# Patient Record
Sex: FEMALE | Race: WHITE | NOT HISPANIC OR LATINO | Employment: PART TIME | URBAN - METROPOLITAN AREA
[De-identification: names, ages, dates, MRNs, and addresses within clinical notes are randomized per-mention and may not be internally consistent; named-entity substitution may affect disease eponyms.]

---

## 2017-04-20 ENCOUNTER — HOSPITAL ENCOUNTER (OUTPATIENT)
Dept: RADIOLOGY | Facility: MEDICAL CENTER | Age: 57
End: 2017-04-20
Attending: PHYSICIAN ASSISTANT
Payer: MEDICAID

## 2017-04-20 DIAGNOSIS — M25.562 LEFT KNEE PAIN, UNSPECIFIED CHRONICITY: ICD-10-CM

## 2017-04-20 PROCEDURE — 73721 MRI JNT OF LWR EXTRE W/O DYE: CPT | Mod: LT

## 2017-06-11 ENCOUNTER — HOSPITAL ENCOUNTER (EMERGENCY)
Facility: MEDICAL CENTER | Age: 57
End: 2017-06-11
Attending: EMERGENCY MEDICINE
Payer: MEDICAID

## 2017-06-11 ENCOUNTER — APPOINTMENT (OUTPATIENT)
Dept: RADIOLOGY | Facility: MEDICAL CENTER | Age: 57
End: 2017-06-11
Attending: EMERGENCY MEDICINE
Payer: MEDICAID

## 2017-06-11 VITALS
RESPIRATION RATE: 16 BRPM | TEMPERATURE: 99.1 F | BODY MASS INDEX: 22.5 KG/M2 | WEIGHT: 140 LBS | DIASTOLIC BLOOD PRESSURE: 91 MMHG | HEIGHT: 66 IN | OXYGEN SATURATION: 97 % | HEART RATE: 68 BPM | SYSTOLIC BLOOD PRESSURE: 138 MMHG

## 2017-06-11 DIAGNOSIS — J40 BRONCHITIS: ICD-10-CM

## 2017-06-11 LAB
ALBUMIN SERPL BCP-MCNC: 3.8 G/DL (ref 3.2–4.9)
ALBUMIN/GLOB SERPL: 1.6 G/DL
ALP SERPL-CCNC: 45 U/L (ref 30–99)
ALT SERPL-CCNC: 16 U/L (ref 2–50)
ANION GAP SERPL CALC-SCNC: 9 MMOL/L (ref 0–11.9)
AST SERPL-CCNC: 18 U/L (ref 12–45)
BASOPHILS # BLD AUTO: 0.3 % (ref 0–1.8)
BASOPHILS # BLD: 0.02 K/UL (ref 0–0.12)
BILIRUB SERPL-MCNC: 0.5 MG/DL (ref 0.1–1.5)
BNP SERPL-MCNC: 69 PG/ML (ref 0–100)
BUN SERPL-MCNC: 11 MG/DL (ref 8–22)
CALCIUM SERPL-MCNC: 9.2 MG/DL (ref 8.5–10.5)
CHLORIDE SERPL-SCNC: 100 MMOL/L (ref 96–112)
CO2 SERPL-SCNC: 23 MMOL/L (ref 20–33)
CREAT SERPL-MCNC: 0.71 MG/DL (ref 0.5–1.4)
EOSINOPHIL # BLD AUTO: 0.03 K/UL (ref 0–0.51)
EOSINOPHIL NFR BLD: 0.5 % (ref 0–6.9)
ERYTHROCYTE [DISTWIDTH] IN BLOOD BY AUTOMATED COUNT: 44 FL (ref 35.9–50)
FLUAV+FLUBV AG SPEC QL IA: NORMAL
GFR SERPL CREATININE-BSD FRML MDRD: >60 ML/MIN/1.73 M 2
GLOBULIN SER CALC-MCNC: 2.4 G/DL (ref 1.9–3.5)
GLUCOSE SERPL-MCNC: 99 MG/DL (ref 65–99)
HCT VFR BLD AUTO: 38.5 % (ref 37–47)
HGB BLD-MCNC: 13.4 G/DL (ref 12–16)
IMM GRANULOCYTES # BLD AUTO: 0.01 K/UL (ref 0–0.11)
IMM GRANULOCYTES NFR BLD AUTO: 0.2 % (ref 0–0.9)
LYMPHOCYTES # BLD AUTO: 1.56 K/UL (ref 1–4.8)
LYMPHOCYTES NFR BLD: 27 % (ref 22–41)
MCH RBC QN AUTO: 32.6 PG (ref 27–33)
MCHC RBC AUTO-ENTMCNC: 34.8 G/DL (ref 33.6–35)
MCV RBC AUTO: 93.7 FL (ref 81.4–97.8)
MONOCYTES # BLD AUTO: 0.47 K/UL (ref 0–0.85)
MONOCYTES NFR BLD AUTO: 8.1 % (ref 0–13.4)
NEUTROPHILS # BLD AUTO: 3.68 K/UL (ref 2–7.15)
NEUTROPHILS NFR BLD: 63.9 % (ref 44–72)
NRBC # BLD AUTO: 0 K/UL
NRBC BLD AUTO-RTO: 0 /100 WBC
PLATELET # BLD AUTO: 122 K/UL (ref 164–446)
PMV BLD AUTO: 9.5 FL (ref 9–12.9)
POTASSIUM SERPL-SCNC: 3.7 MMOL/L (ref 3.6–5.5)
PROT SERPL-MCNC: 6.2 G/DL (ref 6–8.2)
RBC # BLD AUTO: 4.11 M/UL (ref 4.2–5.4)
SIGNIFICANT IND 70042: NORMAL
SITE SITE: NORMAL
SODIUM SERPL-SCNC: 132 MMOL/L (ref 135–145)
SOURCE SOURCE: NORMAL
TROPONIN I SERPL-MCNC: <0.01 NG/ML (ref 0–0.04)
WBC # BLD AUTO: 5.8 K/UL (ref 4.8–10.8)

## 2017-06-11 PROCEDURE — 96360 HYDRATION IV INFUSION INIT: CPT

## 2017-06-11 PROCEDURE — 94640 AIRWAY INHALATION TREATMENT: CPT

## 2017-06-11 PROCEDURE — 83880 ASSAY OF NATRIURETIC PEPTIDE: CPT

## 2017-06-11 PROCEDURE — 96361 HYDRATE IV INFUSION ADD-ON: CPT

## 2017-06-11 PROCEDURE — 84484 ASSAY OF TROPONIN QUANT: CPT

## 2017-06-11 PROCEDURE — 71010 DX-CHEST-PORTABLE (1 VIEW): CPT

## 2017-06-11 PROCEDURE — 700105 HCHG RX REV CODE 258: Performed by: EMERGENCY MEDICINE

## 2017-06-11 PROCEDURE — 700101 HCHG RX REV CODE 250: Performed by: EMERGENCY MEDICINE

## 2017-06-11 PROCEDURE — 87040 BLOOD CULTURE FOR BACTERIA: CPT

## 2017-06-11 PROCEDURE — 99284 EMERGENCY DEPT VISIT MOD MDM: CPT

## 2017-06-11 PROCEDURE — 87400 INFLUENZA A/B EACH AG IA: CPT

## 2017-06-11 PROCEDURE — 85025 COMPLETE CBC W/AUTO DIFF WBC: CPT

## 2017-06-11 PROCEDURE — 80053 COMPREHEN METABOLIC PANEL: CPT

## 2017-06-11 RX ORDER — SODIUM CHLORIDE 9 MG/ML
1000 INJECTION, SOLUTION INTRAVENOUS ONCE
Status: COMPLETED | OUTPATIENT
Start: 2017-06-11 | End: 2017-06-11

## 2017-06-11 RX ORDER — IPRATROPIUM BROMIDE AND ALBUTEROL SULFATE 2.5; .5 MG/3ML; MG/3ML
3 SOLUTION RESPIRATORY (INHALATION)
Status: COMPLETED | OUTPATIENT
Start: 2017-06-11 | End: 2017-06-11

## 2017-06-11 RX ORDER — ALBUTEROL SULFATE 90 UG/1
2 AEROSOL, METERED RESPIRATORY (INHALATION) EVERY 6 HOURS PRN
Qty: 1 INHALER | Refills: 0 | Status: SHIPPED | OUTPATIENT
Start: 2017-06-11

## 2017-06-11 RX ORDER — CODEINE PHOSPHATE/GUAIFENESIN 10-100MG/5
5 LIQUID (ML) ORAL 3 TIMES DAILY PRN
Qty: 120 ML | Refills: 0 | Status: SHIPPED | OUTPATIENT
Start: 2017-06-11

## 2017-06-11 RX ADMIN — IPRATROPIUM BROMIDE AND ALBUTEROL SULFATE 3 ML: .5; 3 SOLUTION RESPIRATORY (INHALATION) at 11:21

## 2017-06-11 RX ADMIN — SODIUM CHLORIDE 1000 ML: 9 INJECTION, SOLUTION INTRAVENOUS at 11:29

## 2017-06-11 ASSESSMENT — PAIN SCALES - GENERAL: PAINLEVEL_OUTOF10: 6

## 2017-06-11 NOTE — ED NOTES
56 y/o female ambulatory to room 11 with EMS with c/o cough, sob and burning pain in her chest when coughing. Pt states her symptoms began last week and have been getting progressively worse.

## 2017-06-11 NOTE — ED AVS SNAPSHOT
Home Care Instructions                                                                                                                Shena Mercado   MRN: [id number]    Department:  [facility], Emergency Dept   Date of Visit:  6/11/2017            [facility], Emergency Dept    [address] [place] [id number]    Phone:  [phone]      You were seen by     Ricardo Camara D.O.      Your Diagnosis Was     Bronchitis     J40       These are the medications you received during your hospitalization from 06/11/2017 1039 to 06/11/2017 1337     Date/Time Order Dose Route Action    06/11/2017 1129 NS infusion 1,000 mL 1,000 mL Intravenous New Bag    06/11/2017 1121 ipratropium-albuterol (DUONEB) nebulizer solution 3 mL 3 mL Nebulization Given      Follow-up Information     1. Follow up with [facility], Emergency Dept.    Specialty:  Emergency Medicine    Why:  If symptoms worsen    Contact information    [address] [place] [id number] [phone]        2. Schedule an appointment as soon as possible for a visit with Gail Porter M.D..    Specialty:  Family Medicine    Why:  As needed    Contact information    [address] [facility] [address]  [phone]        Medication Information     Review all of your home medications and newly ordered medications with your primary doctor and/or pharmacist as soon as possible. Follow medication instructions as directed by your doctor and/or pharmacist.     Please keep your complete medication list with you and share with your physician. Update the information when medications are discontinued, doses are changed, or new medications (including over-the-counter products) are added; and carry medication information at all times in the event of emergency situations.               Medication List      START taking these medications        Instructions    Morning Afternoon Evening Bedtime    albuterol  108 (90 BASE) MCG/ACT Aers inhalation aerosol        Inhale 2 Puffs by mouth every 6 hours as needed for Shortness of Breath.   Dose:  2 Puff                        guaifenesin-codeine 100-10 MG/5ML syrup   Commonly known as:  TUSSI-ORGANIDIN NR        Take 5 mL by mouth 3 times a day as needed for Cough.   Dose:  5 mL                             Where to Get Your Medications      You can get these medications from any pharmacy     Bring a paper prescription for each of these medications    - albuterol 108 (90 BASE) MCG/ACT Aers inhalation aerosol  - guaifenesin-codeine 100-10 MG/5ML syrup            Procedures and tests performed during your visit     Procedure/Test Number of Times Performed    BLOOD CULTURE 2    BTYPE NATRIURETIC PEPTIDE 1    CBC WITH DIFFERENTIAL 1    COMP METABOLIC PANEL 1    DX-CHEST-PORTABLE (1 VIEW) 1    ESTIMATED GFR 1    INFLUENZA RAPID 1    NURSING COMMUNICATION 1    TROPONIN 1        Discharge Instructions       Bronchitis  Bronchitis is the body's way of reacting to injury and/or infection (inflammation) of the bronchi. Bronchi are the air tubes that extend from the windpipe into the lungs. If the inflammation becomes severe, it may cause shortness of breath.  CAUSES   Inflammation may be caused by:  1. A virus.  2. Germs (bacteria).  3. Dust.  4. Allergens.  5. Pollutants and many other irritants.  The cells lining the bronchial tree are covered with tiny hairs (cilia). These constantly beat upward, away from the lungs, toward the mouth. This keeps the lungs free of pollutants. When these cells become too irritated and are unable to do their job, mucus begins to develop. This causes the characteristic cough of bronchitis. The cough clears the lungs when the cilia are unable to do their job. Without either of these protective mechanisms, the mucus would settle in the lungs. Then you would develop pneumonia.  Smoking is a common cause of bronchitis and can contribute to pneumonia.  Stopping this habit is the single most important thing you can do to help yourself.  TREATMENT   1. Your caregiver may prescribe an antibiotic if the cough is caused by bacteria. Also, medicines that open up your airways make it easier to breathe. Your caregiver may also recommend or prescribe an expectorant. It will loosen the mucus to be coughed up. Only take over-the-counter or prescription medicines for pain, discomfort, or fever as directed by your caregiver.  2. Removing whatever causes the problem (smoking, for example) is critical to preventing the problem from getting worse.  3. Cough suppressants may be prescribed for relief of cough symptoms.  4. Inhaled medicines may be prescribed to help with symptoms now and to help prevent problems from returning.  5. For those with recurrent (chronic) bronchitis, there may be a need for steroid medicines.  SEEK IMMEDIATE MEDICAL CARE IF:   · During treatment, you develop more pus-like mucus (purulent sputum).  · You have a fever.  · Your baby is older than 3 months with a rectal temperature of 102° F (38.9° C) or higher.  · Your baby is 3 months old or younger with a rectal temperature of 100.4° F (38° C) or higher.  · You become progressively more ill.  · You have increased difficulty breathing, wheezing, or shortness of breath.  It is necessary to seek immediate medical care if you are elderly or sick from any other disease.  MAKE SURE YOU:   · Understand these instructions.  · Will watch your condition.  · Will get help right away if you are not doing well or get worse.  Document Released: 12/18/2006 Document Revised: 03/11/2013 Document Reviewed: 10/27/2009  ExitCare® Patient Information ©2014 [email or web address].[email or web address].    How to Use an Inhaler  Proper inhaler technique is very important. Good technique ensures that the medicine reaches the lungs. Poor technique results in depositing the medicine on the tongue and back of the throat rather than in the airways. If you do not  use the inhaler with good technique, the medicine will not help you.  STEPS TO FOLLOW IF USING AN INHALER WITHOUT AN EXTENSION TUBE  6. Remove the cap from the inhaler.  7. If you are using the inhaler for the first time, you will need to prime it. Shake the inhaler for 5 seconds and release four puffs into the air, away from your face. Ask your health care provider or pharmacist if you have questions about priming your inhaler.  8. Shake the inhaler for 5 seconds before each breath in (inhalation).  9. Position the inhaler so that the top of the canister faces up.  10. Put your index finger on the top of the medicine canister. Your thumb supports the bottom of the inhaler.  11. Open your mouth.  12. Either place the inhaler between your teeth and place your lips tightly around the mouthpiece, or hold the inhaler 1-2 inches away from your open mouth. If you are unsure of which technique to use, ask your health care provider.  13. Breathe out (exhale) normally and as completely as possible.  14. Press the canister down with your index finger to release the medicine.  15. At the same time as the canister is pressed, inhale deeply and slowly until your lungs are completely filled. This should take 4-6 seconds. Keep your tongue down.  16. Hold the medicine in your lungs for 5-10 seconds (10 seconds is best). This helps the medicine get into the small airways of your lungs.  17. Breathe out slowly, through pursed lips. Whistling is an example of pursed lips.  18. Wait at least 15-30 seconds between puffs. Continue with the above steps until you have taken the number of puffs your health care provider has ordered. Do not use the inhaler more than your health care provider tells you.  19. Replace the cap on the inhaler.  20. Follow the directions from your health care provider or the inhaler insert for cleaning the inhaler.  STEPS TO FOLLOW IF USING AN INHALER WITH AN EXTENSION (SPACER)  6. Remove the cap from the  inhaler.  7. If you are using the inhaler for the first time, you will need to prime it. Shake the inhaler for 5 seconds and release four puffs into the air, away from your face. Ask your health care provider or pharmacist if you have questions about priming your inhaler.  8. Shake the inhaler for 5 seconds before each breath in (inhalation).  9. Place the open end of the spacer onto the mouthpiece of the inhaler.  10. Position the inhaler so that the top of the canister faces up and the spacer mouthpiece faces you.  11. Put your index finger on the top of the medicine canister. Your thumb supports the bottom of the inhaler and the spacer.  12. Breathe out (exhale) normally and as completely as possible.  13. Immediately after exhaling, place the spacer between your teeth and into your mouth. Close your lips tightly around the spacer.  14. Press the canister down with your index finger to release the medicine.  15. At the same time as the canister is pressed, inhale deeply and slowly until your lungs are completely filled. This should take 4-6 seconds. Keep your tongue down and out of the way.  16. Hold the medicine in your lungs for 5-10 seconds (10 seconds is best). This helps the medicine get into the small airways of your lungs. Exhale.  17. Repeat inhaling deeply through the spacer mouthpiece. Again hold that breath for up to 10 seconds (10 seconds is best). Exhale slowly. If it is difficult to take this second deep breath through the spacer, breathe normally several times through the spacer. Remove the spacer from your mouth.  18. Wait at least 15-30 seconds between puffs. Continue with the above steps until you have taken the number of puffs your health care provider has ordered. Do not use the inhaler more than your health care provider tells you.  19. Remove the spacer from the inhaler, and place the cap on the inhaler.  20. Follow the directions from your health care provider or the inhaler insert for  cleaning the inhaler and spacer.  If you are using different kinds of inhalers, use your quick relief medicine to open the airways 10-15 minutes before using a steroid if instructed to do so by your health care provider. If you are unsure which inhalers to use and the order of using them, ask your health care provider, nurse, or respiratory therapist.  If you are using a steroid inhaler, always rinse your mouth with water after your last puff, then gargle and spit out the water. Do not swallow the water.  AVOID:  · Inhaling before or after starting the spray of medicine. It takes practice to coordinate your breathing with triggering the spray.  · Inhaling through the nose (rather than the mouth) when triggering the spray.  HOW TO DETERMINE IF YOUR INHALER IS FULL OR NEARLY EMPTY  You cannot know when an inhaler is empty by shaking it. A few inhalers are now being made with dose counters. Ask your health care provider for a prescription that has a dose counter if you feel you need that extra help. If your inhaler does not have a counter, ask your health care provider to help you determine the date you need to refill your inhaler. Write the refill date on a calendar or your inhaler canister. Refill your inhaler 7-10 days before it runs out. Be sure to keep an adequate supply of medicine. This includes making sure it is not , and that you have a spare inhaler.   SEEK MEDICAL CARE IF:   · Your symptoms are only partially relieved with your inhaler.  · You are having trouble using your inhaler.  · You have some increase in phlegm.  SEEK IMMEDIATE MEDICAL CARE IF:   · You feel little or no relief with your inhalers. You are still wheezing and are feeling shortness of breath or tightness in your chest or both.  · You have dizziness, headaches, or a fast heart rate.  · You have chills, fever, or night sweats.  · You have a noticeable increase in phlegm production, or there is blood in the phlegm.  MAKE SURE YOU:    · Understand these instructions.  · Will watch your condition.  · Will get help right away if you are not doing well or get worse.     This information is not intended to replace advice given to you by your health care provider. Make sure you discuss any questions you have with your health care provider.     Document Released: 12/15/2001 Document Revised: 10/08/2014 Document Reviewed: 07/17/2014  [employer] Interactive Patient Education ©2016 [employer]            Patient Information     Patient Information    Following emergency treatment: all patient requiring follow-up care must return either to a private physician or a clinic if your condition worsens before you are able to obtain further medical attention, please return to the emergency room.     Billing Information    At [facility], we work to make the billing process streamlined for our patients.  Our Representatives are here to answer any questions you may have regarding your hospital bill.  If you have insurance coverage and have supplied your insurance information to us, we will submit a claim to your insurer on your behalf.  Should you have any questions regarding your bill, we can be reached online or by phone as follows:  Online: You are able pay your bills online or live chat with our representatives about any billing questions you may have. We are here to help Monday - Friday from 8:00am to 7:30pm and 9:00am - 12:00pm on Saturdays.  Please visit [email or web address]  for more information.   Phone:  [phone] or [phone]    Please note that your emergency physician, surgeon, pathologist, radiologist, anesthesiologist, and other specialists are not employed by [facility] and will therefore bill separately for their services.  Please contact them directly for any questions concerning their bills at the numbers below:     Emer[facility] Physician Services:  [phone]  [facility]:   [phone]  [facility]:  [phone]  [facility]:  [phone]    1. Your final bill may vary from the amount quoted upon discharge if all procedures are not complete at that time, or if your doctor has additional procedures of which we are not aware. You will receive an additional bill if you return to the Emergency Department at [facility] for suture removal regardless of the facility of which the sutures were placed.     2. Please arrange for settlement of this account at the emergency registration.    3. All self-pay accounts are due in full at the time of treatment.  If you are unable to meet this obligation then payment is expected within 4-5 days.     4. If you have had radiology studies (CT, X-ray, Ultrasound, MRI), you have received a preliminary result during your emergency department visit. Please contact the radiology department [phone] to receive a copy of your final result. Please discuss the Final result with your primary physician or with the follow up physician provided.     Crisis Hotline:  [facility] Crisis Hotline:  4-307-FPURERF or 1-645.548.3462  [place] Crisis Hotline:    1-582.666.2873 or 943-698-2286         ED Discharge Follow Up Questions    1. In order to provide you with very good care, we would like to follow up with a phone call in the next few days.  May we have your permission to contact you?     YES /  NO    2. What is the best phone number to call you? (       )_____-__________    3. What is the best time to call you?      Morning  /  Afternoon  /  Evening                   Patient Signature:  ____________________________________________________________    Date:  ____________________________________________________________

## 2017-06-11 NOTE — ED PROVIDER NOTES
"ED Provider Note    Scribed for Ricardo Camara D.O. by Edith Hancock. 6/11/2017  10:58 AM    Primary care provider: Gail Porter M.D.  Means of arrival: EMS   History obtained from: Patient   History limited by: None    CHIEF COMPLAINT  Chief Complaint   Patient presents with   • Cough   • Shortness of Breath       HPI  Shena Mercado is a 57 y.o. female who presents to the Emergency Department for a cough, worsening two days ago. The patient's illness began with a cough with shortness of breath associated one week ago. The patient's cough and shortness of breath began to worsen two days ago. She also has chest heaviness when she coughs also associated. The patient denies any vomiting, abdominal pain or fever associated.The patient is an occasional smoker. She did not get a flu shot this year.     REVIEW OF SYSTEMS  Pertinent positives include cough, shortness of breath, chest heaviness. Pertinent negatives include no vomiting, abdominal pain, fever.  All other systems reviewed and negative.  C.    PAST MEDICAL HISTORY  No past medical history noted.     SURGICAL HISTORY  No surgical history noted.      SOCIAL HISTORY   Occasional smoker.     FAMILY HISTORY  No family history noted    CURRENT MEDICATIONS  No current medications noted.       ALLERGIES  No Known Allergies    PHYSICAL EXAM  VITAL SIGNS: /91 mmHg  Pulse 70  Temp(Src) 37.3 °C (99.1 °F)  Resp 18  Ht 1.676 m (5' 5.98\")  Wt 63.504 kg (140 lb)  BMI 22.61 kg/m2    Nursing notes and vitals reviewed.  Constitutional: Well developed, Well nourished, No acute distress, Non-toxic appearance.   Eyes: PERRLA, EOMI, Conjunctiva normal, No discharge.   Cardiovascular: Normal heart rate, Normal rhythm, No murmurs, No rubs, No gallops.   Thorax & Lungs: Slight rales, rhonchi, no use of accessory muscles with inspiration and expiration   Abdomen: Bowel sounds normal, Soft, No tenderness, No guarding, No rebound, No masses, No pulsatile " masses.   Skin: Warm, Dry, No erythema, No rash.   Musculoskeletal: Intact distal pulses, No edema, No cyanosis, No clubbing. Good range of motion in all major joints. No tenderness to palpation or major deformities note, no midline back tenderness. No pedal edema  Neurologic: Alert & oriented x 3, Normal motor function, Normal sensory function, No focal deficits noted.  Psychiatric: Affect normal for clinical presentation.    DIAGNOSTIC STUDIES/PROCEDURES    LABS  Results for orders placed or performed during the hospital encounter of 06/11/17   CBC WITH DIFFERENTIAL   Result Value Ref Range    WBC 5.8 4.8 - 10.8 K/uL    RBC 4.11 (L) 4.20 - 5.40 M/uL    Hemoglobin 13.4 12.0 - 16.0 g/dL    Hematocrit 38.5 37.0 - 47.0 %    MCV 93.7 81.4 - 97.8 fL    MCH 32.6 27.0 - 33.0 pg    MCHC 34.8 33.6 - 35.0 g/dL    RDW 44.0 35.9 - 50.0 fL    Platelet Count 122 (L) 164 - 446 K/uL    MPV 9.5 9.0 - 12.9 fL    Neutrophils-Polys 63.90 44.00 - 72.00 %    Lymphocytes 27.00 22.00 - 41.00 %    Monocytes 8.10 0.00 - 13.40 %    Eosinophils 0.50 0.00 - 6.90 %    Basophils 0.30 0.00 - 1.80 %    Immature Granulocytes 0.20 0.00 - 0.90 %    Nucleated RBC 0.00 /100 WBC    Neutrophils (Absolute) 3.68 2.00 - 7.15 K/uL    Lymphs (Absolute) 1.56 1.00 - 4.80 K/uL    Monos (Absolute) 0.47 0.00 - 0.85 K/uL    Eos (Absolute) 0.03 0.00 - 0.51 K/uL    Baso (Absolute) 0.02 0.00 - 0.12 K/uL    Immature Granulocytes (abs) 0.01 0.00 - 0.11 K/uL    NRBC (Absolute) 0.00 K/uL   COMP METABOLIC PANEL   Result Value Ref Range    Sodium 132 (L) 135 - 145 mmol/L    Potassium 3.7 3.6 - 5.5 mmol/L    Chloride 100 96 - 112 mmol/L    Co2 23 20 - 33 mmol/L    Anion Gap 9.0 0.0 - 11.9    Glucose 99 65 - 99 mg/dL    Bun 11 8 - 22 mg/dL    Creatinine 0.71 0.50 - 1.40 mg/dL    Calcium 9.2 8.5 - 10.5 mg/dL    AST(SGOT) 18 12 - 45 U/L    ALT(SGPT) 16 2 - 50 U/L    Alkaline Phosphatase 45 30 - 99 U/L    Total Bilirubin 0.5 0.1 - 1.5 mg/dL    Albumin 3.8 3.2 - 4.9 g/dL    Total  Protein 6.2 6.0 - 8.2 g/dL    Globulin 2.4 1.9 - 3.5 g/dL    A-G Ratio 1.6 g/dL   TROPONIN   Result Value Ref Range    Troponin I <0.01 0.00 - 0.04 ng/mL   BTYPE NATRIURETIC PEPTIDE   Result Value Ref Range    B Natriuretic Peptide 69 0 - 100 pg/mL   INFLUENZA RAPID   Result Value Ref Range    Significant Indicator NEG     Source RESP     Site Nasopharyngeal     Rapid Influenza A-B       Negative for Influenza A and Influenza B antigens.  Infection due to influenza A or B cannot be ruled out  since the antigen present in the specimen may be below the  detection limit of the test. Culture confirmation of  negative samples is recommended.     ESTIMATED GFR   Result Value Ref Range    GFR If African American >60 >60 mL/min/1.73 m 2    GFR If Non African American >60 >60 mL/min/1.73 m 2       All labs reviewed by me.    RADIOLOGY  DX-CHEST-PORTABLE (1 VIEW)   Final Result      No acute cardiopulmonary abnormality identified.        The radiologist's interpretation of all radiological studies have been reviewed by me.    COURSE & MEDICAL DECISION MAKING  Pertinent Labs & Imaging studies reviewed. (See chart for details)    10:58 AM - Patient seen and examined at bedside. Patient will be treated with IV fluids for severe cough, slight shortness of breath hydrate and moisten her secretions 3ml duoneb. Ordered DX-chest, estimated GFR, Influenza rapid, CBC with differential, CMP, blood culture, troponin, BNP to evaluate her symptoms.   This is a charming 57 y.o. female that presents with bronchitis. The patient has a negative chest x-ray for pneumonia, she is extremely hypoxic, she does have evidence of infection and do not believe she is experiencing a pulmonary embolism. The patient had received albuterol Atrovent nebulizer solution on reevaluation, she states she feels much better. I will be prescribing the below medications and do believe she has a viral condition does not require antibiotics. On reevaluation at 1355,  she is speaking in full sentences, she has a normal saturation of oxygen, she has no evidence of impending respiratory distress or respiratory failure. Strict return precautions have been given.  The patient will return for new or worsening symptoms and is stable at the time of discharge.    The patient is referred to a primary physician for blood pressure management, diabetic screening, and for all other preventative health concerns.    DISPOSITION:  Patient will be discharged home in stable condition.    FOLLOW UP:  [facility], Emergency Dept  [address]  [place] [id number] [phone]    If symptoms worsen    Gail Porter M.D.  [address] [address]  [phone]    Schedule an appointment as soon as possible for a visit  As needed      OUTPATIENT MEDICATIONS:  New Prescriptions    ALBUTEROL 108 (90 BASE) MCG/ACT AERO SOLN INHALATION AEROSOL    Inhale 2 Puffs by mouth every 6 hours as needed for Shortness of Breath.    GUAIFENESIN-CODEINE (TUSSI-ORGANIDIN NR) 100-10 MG/5ML SYRUP    Take 5 mL by mouth 3 times a day as needed for Cough.           FINAL IMPRESSION  1. Bronchitis          Edith VILLALTA (Scribe), am scribing for, and in the presence of, Ricardo Camara D.O    Electronically signed by: Edith Hancock (Scribe), 6/11/2017    Ricardo VILLALTA D.O. personally performed the services described in this documentation, as scribed by Edith Hancock in my presence, and it is both accurate and complete.    The note accurately reflects work and decisions made by me.  Ricardo Camara  6/11/2017  1:59 PM

## 2017-06-11 NOTE — ED NOTES
Pt discharged to home. Pt was given follow up instructions and prescriptions. Pt verbalized understanding of all instructions for discharge and is ambulatory out of ED with steady gait.

## 2017-06-11 NOTE — ED AVS SNAPSHOT
6/11/2017    Shena Mercado  [address] [place] [address]    Dear Shena:    [facility] wants to ensure your discharge home is safe and you or your loved ones have had all of your questions answered regarding your care after you leave the hospital.    Below is a list of resources and contact information should you have any questions regarding your hospital stay, follow-up instructions, or active medical symptoms.    Questions or Concerns Regarding… Contact   Medical Questions Related to Your Discharge  (7 days a week, 8am-5pm) Contact a Nurse Care Coordinator   [phone]   Medical Questions Not Related to Your Discharge  (24 hours a day / 7 days a week)  Contact the Nurse Health Line   [phone]    Medications or Discharge Instructions Refer to your discharge packet   or contact your [facility] Primary Care Provider   [phone]   Follow-up Appointment(s) Schedule your appointment via [employer]   or contact Scheduling [phone]   Billing Review your statement via [employer]  or contact Billing [phone]   Medical Records Review your records via [employer]   or contact Medical Records [phone]     You may receive a telephone call within two days of discharge. This call is to make certain you understand your discharge instructions and have the opportunity to have any questions answered. You can also easily access your medical information, test results and upcoming appointments via the [employer] free online health management tool. You can learn more and sign up at [employer]. For assistance setting up your [employer] account, please call [phone].    Once again, we want to ensure your discharge home is safe and that you have a clear understanding of any next steps in your care. If you have any questions or concerns, please do not hesitate to contact us, we are here for you. Thank you for choosing [facility] for your healthcare needs.    Sincerely,    Your [facility] Healthcare Team

## 2017-06-11 NOTE — DISCHARGE INSTRUCTIONS
Bronchitis  Bronchitis is the body's way of reacting to injury and/or infection (inflammation) of the bronchi. Bronchi are the air tubes that extend from the windpipe into the lungs. If the inflammation becomes severe, it may cause shortness of breath.  CAUSES   Inflammation may be caused by:  1. A virus.  2. Germs (bacteria).  3. Dust.  4. Allergens.  5. Pollutants and many other irritants.  The cells lining the bronchial tree are covered with tiny hairs (cilia). These constantly beat upward, away from the lungs, toward the mouth. This keeps the lungs free of pollutants. When these cells become too irritated and are unable to do their job, mucus begins to develop. This causes the characteristic cough of bronchitis. The cough clears the lungs when the cilia are unable to do their job. Without either of these protective mechanisms, the mucus would settle in the lungs. Then you would develop pneumonia.  Smoking is a common cause of bronchitis and can contribute to pneumonia. Stopping this habit is the single most important thing you can do to help yourself.  TREATMENT   1. Your caregiver may prescribe an antibiotic if the cough is caused by bacteria. Also, medicines that open up your airways make it easier to breathe. Your caregiver may also recommend or prescribe an expectorant. It will loosen the mucus to be coughed up. Only take over-the-counter or prescription medicines for pain, discomfort, or fever as directed by your caregiver.  2. Removing whatever causes the problem (smoking, for example) is critical to preventing the problem from getting worse.  3. Cough suppressants may be prescribed for relief of cough symptoms.  4. Inhaled medicines may be prescribed to help with symptoms now and to help prevent problems from returning.  5. For those with recurrent (chronic) bronchitis, there may be a need for steroid medicines.  SEEK IMMEDIATE MEDICAL CARE IF:   · During treatment, you develop more pus-like mucus  (purulent sputum).  · You have a fever.  · Your baby is older than 3 months with a rectal temperature of 102° F (38.9° C) or higher.  · Your baby is 3 months old or younger with a rectal temperature of 100.4° F (38° C) or higher.  · You become progressively more ill.  · You have increased difficulty breathing, wheezing, or shortness of breath.  It is necessary to seek immediate medical care if you are elderly or sick from any other disease.  MAKE SURE YOU:   · Understand these instructions.  · Will watch your condition.  · Will get help right away if you are not doing well or get worse.  Document Released: 12/18/2006 Document Revised: 03/11/2013 Document Reviewed: 10/27/2009  ExitCare® Patient Information ©2014 [employer].    How to Use an Inhaler  Proper inhaler technique is very important. Good technique ensures that the medicine reaches the lungs. Poor technique results in depositing the medicine on the tongue and back of the throat rather than in the airways. If you do not use the inhaler with good technique, the medicine will not help you.  STEPS TO FOLLOW IF USING AN INHALER WITHOUT AN EXTENSION TUBE  6. Remove the cap from the inhaler.  7. If you are using the inhaler for the first time, you will need to prime it. Shake the inhaler for 5 seconds and release four puffs into the air, away from your face. Ask your health care provider or pharmacist if you have questions about priming your inhaler.  8. Shake the inhaler for 5 seconds before each breath in (inhalation).  9. Position the inhaler so that the top of the canister faces up.  10. Put your index finger on the top of the medicine canister. Your thumb supports the bottom of the inhaler.  11. Open your mouth.  12. Either place the inhaler between your teeth and place your lips tightly around the mouthpiece, or hold the inhaler 1-2 inches away from your open mouth. If you are unsure of which technique to use, ask your health care provider.  13. Breathe  out (exhale) normally and as completely as possible.  14. Press the canister down with your index finger to release the medicine.  15. At the same time as the canister is pressed, inhale deeply and slowly until your lungs are completely filled. This should take 4-6 seconds. Keep your tongue down.  16. Hold the medicine in your lungs for 5-10 seconds (10 seconds is best). This helps the medicine get into the small airways of your lungs.  17. Breathe out slowly, through pursed lips. Whistling is an example of pursed lips.  18. Wait at least 15-30 seconds between puffs. Continue with the above steps until you have taken the number of puffs your health care provider has ordered. Do not use the inhaler more than your health care provider tells you.  19. Replace the cap on the inhaler.  20. Follow the directions from your health care provider or the inhaler insert for cleaning the inhaler.  STEPS TO FOLLOW IF USING AN INHALER WITH AN EXTENSION (SPACER)  6. Remove the cap from the inhaler.  7. If you are using the inhaler for the first time, you will need to prime it. Shake the inhaler for 5 seconds and release four puffs into the air, away from your face. Ask your health care provider or pharmacist if you have questions about priming your inhaler.  8. Shake the inhaler for 5 seconds before each breath in (inhalation).  9. Place the open end of the spacer onto the mouthpiece of the inhaler.  10. Position the inhaler so that the top of the canister faces up and the spacer mouthpiece faces you.  11. Put your index finger on the top of the medicine canister. Your thumb supports the bottom of the inhaler and the spacer.  12. Breathe out (exhale) normally and as completely as possible.  13. Immediately after exhaling, place the spacer between your teeth and into your mouth. Close your lips tightly around the spacer.  14. Press the canister down with your index finger to release the medicine.  15. At the same time as the  canister is pressed, inhale deeply and slowly until your lungs are completely filled. This should take 4-6 seconds. Keep your tongue down and out of the way.  16. Hold the medicine in your lungs for 5-10 seconds (10 seconds is best). This helps the medicine get into the small airways of your lungs. Exhale.  17. Repeat inhaling deeply through the spacer mouthpiece. Again hold that breath for up to 10 seconds (10 seconds is best). Exhale slowly. If it is difficult to take this second deep breath through the spacer, breathe normally several times through the spacer. Remove the spacer from your mouth.  18. Wait at least 15-30 seconds between puffs. Continue with the above steps until you have taken the number of puffs your health care provider has ordered. Do not use the inhaler more than your health care provider tells you.  19. Remove the spacer from the inhaler, and place the cap on the inhaler.  20. Follow the directions from your health care provider or the inhaler insert for cleaning the inhaler and spacer.  If you are using different kinds of inhalers, use your quick relief medicine to open the airways 10-15 minutes before using a steroid if instructed to do so by your health care provider. If you are unsure which inhalers to use and the order of using them, ask your health care provider, nurse, or respiratory therapist.  If you are using a steroid inhaler, always rinse your mouth with water after your last puff, then gargle and spit out the water. Do not swallow the water.  AVOID:  · Inhaling before or after starting the spray of medicine. It takes practice to coordinate your breathing with triggering the spray.  · Inhaling through the nose (rather than the mouth) when triggering the spray.  HOW TO DETERMINE IF YOUR INHALER IS FULL OR NEARLY EMPTY  You cannot know when an inhaler is empty by shaking it. A few inhalers are now being made with dose counters. Ask your health care provider for a prescription that  has a dose counter if you feel you need that extra help. If your inhaler does not have a counter, ask your health care provider to help you determine the date you need to refill your inhaler. Write the refill date on a calendar or your inhaler canister. Refill your inhaler 7-10 days before it runs out. Be sure to keep an adequate supply of medicine. This includes making sure it is not , and that you have a spare inhaler.   SEEK MEDICAL CARE IF:   · Your symptoms are only partially relieved with your inhaler.  · You are having trouble using your inhaler.  · You have some increase in phlegm.  SEEK IMMEDIATE MEDICAL CARE IF:   · You feel little or no relief with your inhalers. You are still wheezing and are feeling shortness of breath or tightness in your chest or both.  · You have dizziness, headaches, or a fast heart rate.  · You have chills, fever, or night sweats.  · You have a noticeable increase in phlegm production, or there is blood in the phlegm.  MAKE SURE YOU:   · Understand these instructions.  · Will watch your condition.  · Will get help right away if you are not doing well or get worse.     This information is not intended to replace advice given to you by your health care provider. Make sure you discuss any questions you have with your health care provider.     Document Released: 12/15/2001 Document Revised: 10/08/2014 Document Reviewed: 2014  [employer] Patient Education © [employer]

## 2017-06-11 NOTE — ED NOTES
Xray of chest complete. Blood drawn and sent to lab. Lab at bedside to draw second set of blood cultures.

## 2017-06-11 NOTE — ED AVS SNAPSHOT
TapDog Access Code: Activation code not generated  Current TapDog Status: Patient Declined    Your email address is not on file at [facility].  Email Addresses are required for you to sign up for TapDog, please contact [phone] to verify your personal information and to provide your email address prior to attempting to register for TapDog.    Shena Rogelio  [address] [place] [address]    [employer]  A secure, online tool to manage your health information     [facility]’s TapDog® is a secure, online tool that connects you to your personalized health information from the privacy of your home -- day or night - making it very easy for you to manage your healthcare. Once the activation process is completed, you can even access your medical information using the TapDog nikki, which is available for free in the Apple Nikki store or Google Play store.     To learn more about TapDog, visit [email or web address]    There are two levels of access available (as shown below):   My Chart Features  [facility] Primary Care Doctor [facility]  Specialists [facility]  Urgent  Care Non-[facility] Primary Care Doctor   Email your healthcare team securely and privately 24/7 X X X    Manage appointments: schedule your next appointment; view details of past/upcoming appointments X      Request prescription refills. X      View recent personal medical records, including lab and immunizations X X X X   View health record, including health history, allergies, medications X X X X   Read reports about your outpatient visits, procedures, consult and ER notes X X X X   See your discharge summary, which is a recap of your hospital and/or ER visit that includes your diagnosis, lab results, and care plan X X  X     How to register for [employer]:  Once your e-mail address has been verified, follow the following steps to sign up for [employer].     1. Go to  [email or web address]  2. Click on the Sign Up Now box, which takes you to the New  Member Sign Up page. You will need to provide the following information:  a. Enter your [employer] Access Code exactly as it appears at the top of this page. (You will not need to use this code after you’ve completed the sign-up process. If you do not sign up before the expiration date, you must request a new code.)   b. Enter your date of birth.   c. Enter your home email address.   d. Click Submit, and follow the next screen’s instructions.  3. Create a nkf-pharmat ID. This will be your VoiceBunny login ID and cannot be changed, so think of one that is secure and easy to remember.  4. Create a VoiceBunny password. You can change your password at any time.  5. Enter your Password Reset Question and Answer. This can be used at a later time if you forget your password.   6. Enter your e-mail address. This allows you to receive e-mail notifications when new information is available in VoiceBunny.  7. Click Sign Up. You can now view your health information.    For assistance activating your [employer] account, call [phone]

## 2017-06-12 ENCOUNTER — PATIENT OUTREACH (OUTPATIENT)
Dept: HEALTH INFORMATION MANAGEMENT | Facility: OTHER | Age: 57
End: 2017-06-12

## 2017-06-12 NOTE — PROGRESS NOTES
· Placed discharge outreach phone call to patient s/p ER discharge 6/11/17.  Left voicemail with my contact information and instructions to return my phone call.    · 6/13/17 at 10:50 AM--Received incoming VM from pt at 10:23 AM.  Placed phone call to patient, discharge outreach completed.

## 2017-06-16 LAB
BACTERIA BLD CULT: NORMAL
BACTERIA BLD CULT: NORMAL
SIGNIFICANT IND 70042: NORMAL
SIGNIFICANT IND 70042: NORMAL
SITE SITE: NORMAL
SITE SITE: NORMAL
SOURCE SOURCE: NORMAL
SOURCE SOURCE: NORMAL

## 2018-02-22 ENCOUNTER — APPOINTMENT (OUTPATIENT)
Dept: OTHER | Facility: MEDICAL CENTER | Age: 58
End: 2018-02-22
Payer: MEDICAID

## 2018-03-20 ENCOUNTER — APPOINTMENT (OUTPATIENT)
Dept: OTHER | Facility: MEDICAL CENTER | Age: 58
End: 2018-03-20
Payer: MEDICAID

## 2018-04-17 ENCOUNTER — APPOINTMENT (OUTPATIENT)
Dept: OTHER | Facility: MEDICAL CENTER | Age: 58
End: 2018-04-17
Payer: MEDICAID

## 2018-06-19 ENCOUNTER — HOSPITAL ENCOUNTER (OUTPATIENT)
Dept: RADIOLOGY | Facility: MEDICAL CENTER | Age: 58
End: 2018-06-19
Attending: ORTHOPAEDIC SURGERY
Payer: MEDICAID

## 2018-06-19 DIAGNOSIS — M25.531 WRIST PAIN, RIGHT: ICD-10-CM

## 2018-06-19 PROCEDURE — 700117 HCHG RX CONTRAST REV CODE 255: Performed by: RADIOLOGY

## 2018-06-19 PROCEDURE — 25246 INJECTION FOR WRIST X-RAY: CPT

## 2018-06-19 PROCEDURE — 73222 MRI JOINT UPR EXTREM W/DYE: CPT | Mod: RT

## 2018-06-19 RX ADMIN — IOHEXOL 2 ML: 300 INJECTION, SOLUTION INTRAVENOUS at 16:00

## 2019-07-17 ENCOUNTER — APPOINTMENT (OUTPATIENT)
Dept: RADIOLOGY | Facility: MEDICAL CENTER | Age: 59
End: 2019-07-17
Attending: PHYSICIAN ASSISTANT
Payer: MEDICAID

## 2019-08-23 ENCOUNTER — HOSPITAL ENCOUNTER (OUTPATIENT)
Dept: RADIOLOGY | Facility: MEDICAL CENTER | Age: 59
End: 2019-08-23
Attending: PHYSICIAN ASSISTANT
Payer: MEDICAID

## 2019-08-23 DIAGNOSIS — M25.511 RIGHT SHOULDER PAIN, UNSPECIFIED CHRONICITY: ICD-10-CM

## 2019-08-23 DIAGNOSIS — M75.101 ROTATOR CUFF SYNDROME OF RIGHT SHOULDER: ICD-10-CM

## 2019-08-23 PROCEDURE — 73221 MRI JOINT UPR EXTREM W/O DYE: CPT | Mod: RT

## 2021-10-28 ENCOUNTER — HOSPITAL ENCOUNTER (OUTPATIENT)
Dept: RADIOLOGY | Facility: MEDICAL CENTER | Age: 61
End: 2021-10-28
Attending: FAMILY MEDICINE
Payer: COMMERCIAL

## 2021-10-28 DIAGNOSIS — N63.10 MASS OF RIGHT BREAST: ICD-10-CM

## 2021-10-28 PROCEDURE — 76642 ULTRASOUND BREAST LIMITED: CPT | Mod: LT

## 2021-10-28 PROCEDURE — G0279 TOMOSYNTHESIS, MAMMO: HCPCS

## 2022-12-14 ENCOUNTER — HOSPITAL ENCOUNTER (OUTPATIENT)
Dept: RADIOLOGY | Facility: MEDICAL CENTER | Age: 62
End: 2022-12-14
Attending: FAMILY MEDICINE
Payer: MEDICAID

## 2022-12-14 DIAGNOSIS — Z00.00 PREVENTATIVE HEALTH CARE: ICD-10-CM

## 2022-12-14 PROCEDURE — 77063 BREAST TOMOSYNTHESIS BI: CPT

## 2023-01-07 ENCOUNTER — HOSPITAL ENCOUNTER (OUTPATIENT)
Dept: RADIOLOGY | Facility: MEDICAL CENTER | Age: 63
End: 2023-01-07
Attending: NURSE PRACTITIONER
Payer: MEDICAID

## 2023-01-07 DIAGNOSIS — R22.41 MASS OF FOOT, RIGHT: ICD-10-CM

## 2023-01-07 DIAGNOSIS — M79.671 FOOT PAIN, RIGHT: ICD-10-CM

## 2023-01-07 PROCEDURE — 700117 HCHG RX CONTRAST REV CODE 255

## 2023-01-07 PROCEDURE — A9579 GAD-BASE MR CONTRAST NOS,1ML: HCPCS

## 2023-01-07 PROCEDURE — 73720 MRI LWR EXTREMITY W/O&W/DYE: CPT | Mod: RT

## 2023-01-07 RX ADMIN — GADOTERIDOL 15 ML: 279.3 INJECTION, SOLUTION INTRAVENOUS at 17:53

## 2023-01-30 PROBLEM — M79.671 PAIN IN RIGHT FOOT: Status: ACTIVE | Noted: 2023-01-30

## 2023-01-30 PROBLEM — M25.571 PAIN IN RIGHT ANKLE: Status: ACTIVE | Noted: 2023-01-30
